# Patient Record
Sex: MALE | Race: WHITE | NOT HISPANIC OR LATINO | URBAN - METROPOLITAN AREA
[De-identification: names, ages, dates, MRNs, and addresses within clinical notes are randomized per-mention and may not be internally consistent; named-entity substitution may affect disease eponyms.]

---

## 2018-11-12 PROBLEM — 400047006 PERIPHERAL VASCULAR DISEASE: Status: ACTIVE | Noted: 2018-11-12

## 2018-11-12 PROBLEM — 385627004 CELLULITIS: Status: ACTIVE | Noted: 2018-11-12

## 2018-11-12 PROBLEM — 38341003 HYPERTENSION: Status: ACTIVE | Noted: 2018-11-12

## 2018-11-12 PROBLEM — 73430006 SLEEP APNEA: Status: ACTIVE | Noted: 2018-11-12

## 2018-11-12 PROBLEM — 14304000 THYROID DISORDER: Status: ACTIVE | Noted: 2018-11-12

## 2018-11-12 PROBLEM — 3723001 ARTHRITIS: Status: ACTIVE | Noted: 2018-11-12

## 2018-11-12 PROBLEM — 254900004 PROSTATE CANCER: Status: ACTIVE | Noted: 2018-11-12

## 2018-11-12 PROBLEM — 271737000 ANEMIA: Status: ACTIVE | Noted: 2018-11-12

## 2018-11-12 PROBLEM — 267038008 EDEMA: Status: ACTIVE | Noted: 2018-11-12

## 2018-12-10 PROBLEM — 428283002 HISTORY OF POLYP OF COLON: Status: ACTIVE | Noted: 2018-12-10

## 2018-12-10 PROBLEM — 161701005 HISTORY OF RESPIRATOR DEPENDENCE: Status: ACTIVE | Noted: 2018-12-10

## 2020-08-11 ENCOUNTER — OFFICE VISIT (OUTPATIENT)
Dept: URBAN - METROPOLITAN AREA CLINIC 46 | Facility: CLINIC | Age: 80
End: 2020-08-11

## 2020-08-11 PROBLEM — 70153002 HEMORRHOIDS: Status: ACTIVE | Noted: 2020-08-11

## 2020-08-11 PROBLEM — 255417007 CIRRHOTIC: Status: ACTIVE | Noted: 2020-08-11

## 2020-08-11 PROBLEM — 90708001 RENAL DISEASE: Status: ACTIVE | Noted: 2020-08-11

## 2020-09-22 ENCOUNTER — OFFICE VISIT (OUTPATIENT)
Dept: URBAN - METROPOLITAN AREA CLINIC 46 | Facility: CLINIC | Age: 80
End: 2020-09-22

## 2020-11-13 ENCOUNTER — OFFICE VISIT (OUTPATIENT)
Dept: URBAN - METROPOLITAN AREA SURGERY CENTER 28 | Facility: SURGERY CENTER | Age: 80
End: 2020-11-13

## 2020-11-13 LAB — PDFREPORT1: (no result)

## 2020-11-16 ENCOUNTER — OFFICE VISIT (OUTPATIENT)
Dept: URBAN - METROPOLITAN AREA CLINIC 13 | Facility: CLINIC | Age: 80
End: 2020-11-16

## 2020-11-17 ENCOUNTER — LAB OUTSIDE AN ENCOUNTER (OUTPATIENT)
Dept: URBAN - METROPOLITAN AREA CLINIC 13 | Facility: CLINIC | Age: 80
End: 2020-11-17

## 2020-11-18 ENCOUNTER — OFFICE VISIT (OUTPATIENT)
Dept: URBAN - METROPOLITAN AREA CLINIC 13 | Facility: CLINIC | Age: 80
End: 2020-11-18

## 2020-11-19 ENCOUNTER — OFFICE VISIT (OUTPATIENT)
Dept: URBAN - METROPOLITAN AREA CLINIC 13 | Facility: CLINIC | Age: 80
End: 2020-11-19

## 2020-12-29 ENCOUNTER — OFFICE VISIT (OUTPATIENT)
Dept: URBAN - METROPOLITAN AREA CLINIC 46 | Facility: CLINIC | Age: 80
End: 2020-12-29

## 2021-01-04 ENCOUNTER — OFFICE VISIT (OUTPATIENT)
Dept: URBAN - METROPOLITAN AREA CLINIC 13 | Facility: CLINIC | Age: 81
End: 2021-01-04

## 2021-02-19 ENCOUNTER — OFFICE VISIT (OUTPATIENT)
Dept: URBAN - METROPOLITAN AREA CLINIC 13 | Facility: CLINIC | Age: 81
End: 2021-02-19

## 2021-03-31 ENCOUNTER — OFFICE VISIT (OUTPATIENT)
Dept: URBAN - METROPOLITAN AREA CLINIC 46 | Facility: CLINIC | Age: 81
End: 2021-03-31

## 2021-05-03 ENCOUNTER — OFFICE VISIT (OUTPATIENT)
Dept: URBAN - METROPOLITAN AREA CLINIC 13 | Facility: CLINIC | Age: 81
End: 2021-05-03

## 2021-05-13 ENCOUNTER — OFFICE VISIT (OUTPATIENT)
Dept: URBAN - METROPOLITAN AREA CLINIC 46 | Facility: CLINIC | Age: 81
End: 2021-05-13

## 2021-05-13 PROBLEM — 235595009 GASTROESOPHAGEAL REFLUX DISEASE: Status: ACTIVE | Noted: 2021-05-13

## 2021-05-18 ENCOUNTER — OFFICE VISIT (OUTPATIENT)
Dept: URBAN - METROPOLITAN AREA CLINIC 13 | Facility: CLINIC | Age: 81
End: 2021-05-18

## 2021-06-29 ENCOUNTER — OFFICE VISIT (OUTPATIENT)
Dept: URBAN - METROPOLITAN AREA CLINIC 46 | Facility: CLINIC | Age: 81
End: 2021-06-29

## 2021-07-08 ENCOUNTER — OFFICE VISIT (OUTPATIENT)
Dept: URBAN - METROPOLITAN AREA CLINIC 46 | Facility: CLINIC | Age: 81
End: 2021-07-08

## 2021-07-08 PROBLEM — 13644009 HYPERCHOLESTEROLEMIA: Status: ACTIVE | Noted: 2021-07-08

## 2021-07-13 ENCOUNTER — OFFICE VISIT (OUTPATIENT)
Dept: URBAN - METROPOLITAN AREA CLINIC 13 | Facility: CLINIC | Age: 81
End: 2021-07-13

## 2021-08-28 ENCOUNTER — TELEPHONE ENCOUNTER (OUTPATIENT)
Dept: URBAN - METROPOLITAN AREA CLINIC 13 | Facility: CLINIC | Age: 81
End: 2021-08-28

## 2021-08-28 RX ORDER — FERROUS SULFATE 324(65)MG
TABLET, DELAYED RELEASE (ENTERIC COATED) ORAL
OUTPATIENT
End: 2021-03-31

## 2021-08-28 RX ORDER — RIFAXIMIN 550 MG/1
TABLET ORAL
OUTPATIENT
Start: 2020-09-22 | End: 2020-12-29

## 2021-08-28 RX ORDER — SPIRONOLACTONE 50 MG/1
TABLET, FILM COATED ORAL
OUTPATIENT
End: 2020-09-15

## 2021-08-28 RX ORDER — TAMSULOSIN HYDROCHLORIDE 0.4 MG/1
CAPSULE ORAL
OUTPATIENT
End: 2021-03-31

## 2021-08-28 RX ORDER — PANTOPRAZOLE SODIUM 40 MG/1
TABLET, DELAYED RELEASE ORAL
OUTPATIENT
Start: 2018-12-10 | End: 2020-12-29

## 2021-08-28 RX ORDER — ASPIRIN 81 MG/1
TABLET, COATED ORAL
OUTPATIENT
End: 2021-03-31

## 2021-08-28 RX ORDER — SPIRONOLACTONE 50 MG/1
TABLET, FILM COATED ORAL
OUTPATIENT
Start: 2020-08-11 | End: 2020-09-15

## 2021-08-28 RX ORDER — FUROSEMIDE 40 MG/1
TABLET ORAL
OUTPATIENT
End: 2021-02-19

## 2021-08-28 RX ORDER — ALBUTEROL SULFATE 90 UG/1
AEROSOL, METERED RESPIRATORY (INHALATION)
OUTPATIENT
End: 2021-03-31

## 2021-08-28 RX ORDER — SUCRALFATE 1 G/1
TABLET ORAL
OUTPATIENT
Start: 2020-11-13 | End: 2021-05-13

## 2021-08-29 ENCOUNTER — TELEPHONE ENCOUNTER (OUTPATIENT)
Dept: URBAN - METROPOLITAN AREA CLINIC 13 | Facility: CLINIC | Age: 81
End: 2021-08-29

## 2021-08-29 RX ORDER — LABETALOL HYDROCHLORIDE 300 MG/1
TABLET, FILM COATED ORAL
Status: ACTIVE | COMMUNITY

## 2021-08-29 RX ORDER — LEVOTHYROXINE SODIUM 0.14 MG/1
TABLET ORAL
Status: ACTIVE | COMMUNITY

## 2021-08-29 RX ORDER — RIFAXIMIN 550 MG/1
TABLET ORAL
Status: ACTIVE | COMMUNITY
Start: 2021-07-08

## 2021-08-29 RX ORDER — TRIAMCINOLONE ACETONIDE 5 MG/G
CREAM TOPICAL
Status: ACTIVE | COMMUNITY

## 2021-08-29 RX ORDER — AMOXICILLIN 500 MG/1
CAPSULE ORAL
Status: ACTIVE | COMMUNITY

## 2021-08-29 RX ORDER — CLOPIDOGREL 75 MG/1
TABLET ORAL
Status: ACTIVE | COMMUNITY

## 2021-08-29 RX ORDER — PANTOPRAZOLE SODIUM 40 MG/1
TABLET, DELAYED RELEASE ORAL
Status: ACTIVE | COMMUNITY
Start: 2020-12-29

## 2021-08-29 RX ORDER — SPIRONOLACTONE 50 MG/1
TABLET, FILM COATED ORAL
Status: ACTIVE | COMMUNITY
Start: 2020-09-15

## 2021-08-29 RX ORDER — ENZALUTAMIDE 40 MG/1
CAPSULE ORAL
Status: ACTIVE | COMMUNITY

## 2021-08-29 RX ORDER — FLUTICASONE PROPIONATE 50 UG/1
SPRAY, METERED NASAL
Status: ACTIVE | COMMUNITY

## 2021-08-29 RX ORDER — FUROSEMIDE 40 MG/1
TABLET ORAL
Status: ACTIVE | COMMUNITY

## 2021-08-29 RX ORDER — HYDROCODONE BITARTRATE AND ACETAMINOPHEN 7.5; 325 MG/1; MG/1
TABLET ORAL
Status: ACTIVE | COMMUNITY

## 2021-09-29 ENCOUNTER — ERX REFILL RESPONSE (OUTPATIENT)
Dept: URBAN - METROPOLITAN AREA CLINIC 44 | Facility: CLINIC | Age: 81
End: 2021-09-29

## 2021-09-29 RX ORDER — FUROSEMIDE 40 MG/1
TABLET ORAL
OUTPATIENT

## 2021-09-29 RX ORDER — FUROSEMIDE 40 MG/1
TAKE 1 TABLET BY MOUTH TWICE A DAY TABLET ORAL
Qty: 180 TABLET | Refills: 1 | OUTPATIENT

## 2021-10-04 ENCOUNTER — OFFICE VISIT (OUTPATIENT)
Dept: URBAN - METROPOLITAN AREA CLINIC 46 | Facility: CLINIC | Age: 81
End: 2021-10-04

## 2021-10-05 ENCOUNTER — OFFICE VISIT (OUTPATIENT)
Dept: URBAN - METROPOLITAN AREA CLINIC 46 | Facility: CLINIC | Age: 81
End: 2021-10-05
Payer: MEDICARE

## 2021-10-05 VITALS
DIASTOLIC BLOOD PRESSURE: 66 MMHG | TEMPERATURE: 97.4 F | SYSTOLIC BLOOD PRESSURE: 126 MMHG | HEART RATE: 74 BPM | HEIGHT: 70 IN | WEIGHT: 175 LBS | BODY MASS INDEX: 25.05 KG/M2

## 2021-10-05 DIAGNOSIS — R18.8 OTHER ASCITES: ICD-10-CM

## 2021-10-05 DIAGNOSIS — K74.60 UNSPECIFIED CIRRHOSIS OF LIVER: ICD-10-CM

## 2021-10-05 PROCEDURE — 99214 OFFICE O/P EST MOD 30 MIN: CPT | Performed by: INTERNAL MEDICINE

## 2021-10-05 RX ORDER — PANTOPRAZOLE SODIUM 40 MG/1
TABLET, DELAYED RELEASE ORAL
Status: ACTIVE | COMMUNITY
Start: 2020-12-29

## 2021-10-05 RX ORDER — SPIRONOLACTONE 50 MG/1
TABLET, FILM COATED ORAL
Status: ACTIVE | COMMUNITY
Start: 2020-09-15

## 2021-10-05 RX ORDER — TRIAMCINOLONE ACETONIDE 5 MG/G
CREAM TOPICAL
Status: ACTIVE | COMMUNITY

## 2021-10-05 RX ORDER — FLUTICASONE PROPIONATE 50 UG/1
SPRAY, METERED NASAL
Status: ACTIVE | COMMUNITY

## 2021-10-05 RX ORDER — LEVOTHYROXINE SODIUM 0.14 MG/1
TABLET ORAL
Status: ACTIVE | COMMUNITY

## 2021-10-05 RX ORDER — LABETALOL HYDROCHLORIDE 300 MG/1
TABLET, FILM COATED ORAL
Status: ACTIVE | COMMUNITY

## 2021-10-05 RX ORDER — ZOLPIDEM TARTRATE 5 MG/1
1 TABLET AT BEDTIME TABLET, FILM COATED ORAL ONCE A DAY
Status: ACTIVE | COMMUNITY

## 2021-10-05 RX ORDER — PANTOPRAZOLE SODIUM 40 MG/1
TABLET, DELAYED RELEASE ORAL
OUTPATIENT
Start: 2020-12-29

## 2021-10-05 RX ORDER — ENZALUTAMIDE 40 MG/1
CAPSULE ORAL
Status: ACTIVE | COMMUNITY

## 2021-10-05 RX ORDER — FUROSEMIDE 40 MG/1
TAKE 1 TABLET BY MOUTH TWICE A DAY TABLET ORAL
OUTPATIENT

## 2021-10-05 RX ORDER — FUROSEMIDE 40 MG/1
TAKE 1 TABLET BY MOUTH TWICE A DAY TABLET ORAL
Qty: 180 TABLET | Refills: 1 | Status: ACTIVE | COMMUNITY

## 2021-10-05 RX ORDER — SPIRONOLACTONE 50 MG/1
TABLET, FILM COATED ORAL
OUTPATIENT
Start: 2020-09-15

## 2021-10-05 RX ORDER — HYDROCODONE BITARTRATE AND ACETAMINOPHEN 7.5; 325 MG/1; MG/1
TABLET ORAL
Status: ACTIVE | COMMUNITY

## 2021-10-05 RX ORDER — AMOXICILLIN 500 MG/1
CAPSULE ORAL
Status: DISCONTINUED | COMMUNITY

## 2021-10-05 RX ORDER — CLOPIDOGREL 75 MG/1
TABLET ORAL
Status: DISCONTINUED | COMMUNITY

## 2021-10-05 NOTE — HPI-TODAY'S VISIT:
Pt with a hx of radhaley AKHTAR cirrhosis dx 12/2018. EGD and colonoscopy 12/2018 revealed small esophageal varices and portal HTN gastropathy with a large pre-pyloric hyperplastic polyps that was removed and a colon polyps. Re-presented 8/11/20 for new onset ascites/hepatic decompensation likely related to stress from uncomplicated right hip replacement on 7/10/20. LVP 8/17/20 with 2.5 Liters removed and fluid studies consistent with portal HTN. Despite increasing doses of diuretics pt became paracentesis dependent requiring LVP every 2-3 weeks. Most recently on Lasix 40mg BID and Aldacton 50mg BID (100mg causing hypotension). Repeat EGD 11/13/2020 revealed re-growth of the pre-pyloric 5cm lesion and removed endoscopically and path still showing is hyperplastic. CT abd/pelvis without liver lesions. Since 5/2021 developed recurrent pleural effusion is now s/p a Pluerex drain 6/21/2021. Last seen 7/8/2021 and pt had continuedto loose weight and have anorexia; getting weak and not able to walk well; has times of confusion despite lactulose BID (was not able to get Xifaxin). Still taking Lasix/Aldactone 40/50mg BID. Pt referred to palliative care. Not seen since and presents for a follow up. Stable and no further decompemsation; draining Pleurex three times a week now. Had a few falls but none were severe. No confusion. No bleeding. Some insomina. Taking Lasix 40mg BID and Aldactone 100mg daily; 20gm Lactulose BID. PPI. Labs 8/30/2021 with Cr 1.26; Albumin 3.4; normal LFT's; Hgb 11.3; PLT 89. INR 0.97

## 2021-10-05 NOTE — PHYSICAL EXAM CHEST:
no lesions,  no deformities,  no traumatic injuries,  no significant scars are present,  chest wall non-tender,  no masses present, breathing is unlabored without accessory muscle use, diminished breath on right lung

## 2021-10-05 NOTE — PHYSICAL EXAM GASTROINTESTINAL
Abdomen , soft, nontender, mild distention with fluid wave. , normal bowel sounds , Liver and Spleen , no hepatomegaly present , no hepatosplenomegaly , liver nontender , spleen not palpable

## 2021-10-15 ENCOUNTER — TELEPHONE ENCOUNTER (OUTPATIENT)
Dept: URBAN - METROPOLITAN AREA CLINIC 46 | Facility: CLINIC | Age: 81
End: 2021-10-15

## 2021-10-21 ENCOUNTER — ERX REFILL RESPONSE (OUTPATIENT)
Age: 81
End: 2021-10-21

## 2021-10-21 RX ORDER — LACTULOSE 10 G/15ML
30ML SOLUTION ORAL
Qty: 1200 MILLILITER | Refills: 4 | OUTPATIENT

## 2021-10-25 ENCOUNTER — TELEPHONE ENCOUNTER (OUTPATIENT)
Dept: URBAN - METROPOLITAN AREA CLINIC 46 | Facility: CLINIC | Age: 81
End: 2021-10-25

## 2021-11-02 ENCOUNTER — OFFICE VISIT (OUTPATIENT)
Dept: URBAN - METROPOLITAN AREA CLINIC 46 | Facility: CLINIC | Age: 81
End: 2021-11-02
Payer: MEDICARE

## 2021-11-02 VITALS
BODY MASS INDEX: 26.08 KG/M2 | TEMPERATURE: 98.4 F | SYSTOLIC BLOOD PRESSURE: 129 MMHG | WEIGHT: 182.2 LBS | HEIGHT: 70 IN | DIASTOLIC BLOOD PRESSURE: 67 MMHG | HEART RATE: 82 BPM

## 2021-11-02 DIAGNOSIS — R18.8 OTHER ASCITES: ICD-10-CM

## 2021-11-02 DIAGNOSIS — K74.60 UNSPECIFIED CIRRHOSIS OF LIVER: ICD-10-CM

## 2021-11-02 PROCEDURE — 99215 OFFICE O/P EST HI 40 MIN: CPT | Performed by: INTERNAL MEDICINE

## 2021-11-02 PROCEDURE — 99205 OFFICE O/P NEW HI 60 MIN: CPT | Performed by: INTERNAL MEDICINE

## 2021-11-02 RX ORDER — ENZALUTAMIDE 40 MG/1
CAPSULE ORAL
Status: ACTIVE | COMMUNITY

## 2021-11-02 RX ORDER — FUROSEMIDE 40 MG/1
TAKE 1 TABLET BY MOUTH TWICE A DAY TABLET ORAL
Status: ACTIVE | COMMUNITY

## 2021-11-02 RX ORDER — ZOLPIDEM TARTRATE 5 MG/1
1 TABLET AT BEDTIME TABLET, FILM COATED ORAL ONCE A DAY
Status: DISCONTINUED | COMMUNITY

## 2021-11-02 RX ORDER — SPIRONOLACTONE 50 MG/1
TABLET, FILM COATED ORAL
Status: ACTIVE | COMMUNITY
Start: 2020-09-15

## 2021-11-02 RX ORDER — TRIAMCINOLONE ACETONIDE 5 MG/G
CREAM TOPICAL
Status: ACTIVE | COMMUNITY

## 2021-11-02 RX ORDER — PANTOPRAZOLE SODIUM 40 MG/1
TABLET, DELAYED RELEASE ORAL
Status: ACTIVE | COMMUNITY
Start: 2020-12-29

## 2021-11-02 RX ORDER — LACTULOSE 10 G/15ML
30ML SOLUTION ORAL
Qty: 1200 MILLILITER | Refills: 4 | Status: ACTIVE | COMMUNITY

## 2021-11-02 RX ORDER — FLUTICASONE PROPIONATE 50 UG/1
SPRAY, METERED NASAL
Status: ACTIVE | COMMUNITY

## 2021-11-02 RX ORDER — HYDROCODONE BITARTRATE AND ACETAMINOPHEN 7.5; 325 MG/1; MG/1
TABLET ORAL
Status: DISCONTINUED | COMMUNITY

## 2021-11-02 RX ORDER — NICOTINE POLACRILEX 2 MG
AS DIRECTED LOZENGE BUCCAL
Status: ACTIVE | COMMUNITY

## 2021-11-02 RX ORDER — TRAZODONE HYDROCHLORIDE 50 MG/1
1 TABLET AT BEDTIME AS NEEDED TABLET, FILM COATED ORAL ONCE A DAY
Status: ACTIVE | COMMUNITY

## 2021-11-02 RX ORDER — AMITRIPTYLINE HYDROCHLORIDE 10 MG/1
1 TABLET AT BEDTIME TABLET, FILM COATED ORAL ONCE A DAY
Status: ACTIVE | COMMUNITY

## 2021-11-02 RX ORDER — B-COMPLEX WITH VITAMIN C
AS DIRECTED TABLET ORAL
Status: ACTIVE | COMMUNITY

## 2021-11-02 RX ORDER — LABETALOL HYDROCHLORIDE 300 MG/1
TABLET, FILM COATED ORAL
Status: DISCONTINUED | COMMUNITY

## 2021-11-02 RX ORDER — LEVOTHYROXINE SODIUM 0.14 MG/1
TABLET ORAL
Status: ACTIVE | COMMUNITY

## 2021-11-02 NOTE — PHYSICAL EXAM GASTROINTESTINAL
Abdomen , distended and tense with fluid wave , normal bowel sounds , Liver and Spleen , no hepatomegaly present , no hepatosplenomegaly , liver nontender , spleen not palpable; reducible umbilical hernia

## 2021-11-02 NOTE — HPI-TODAY'S VISIT:
Pt with a hx of radhaley AKHTAR cirrhosis dx 12/2018. EGD and colonoscopy 12/2018 revealed small esophageal varices and portal HTN gastropathy with a large pre-pyloric hyperplastic polyps that was removed and a colon polyps. Re-presented 8/11/20 for new onset ascites/hepatic decompensation likely related to stress from uncomplicated right hip replacement on 7/10/20. LVP 8/17/20 with 2.5 Liters removed and fluid studies consistent with portal HTN. Despite increasing doses of diuretics pt became paracentesis dependent requiring LVP every 2-3 weeks. Most recently on Lasix 40mg BID and Aldacton 50mg BID (100mg causing hypotension). Repeat EGD 11/13/2020 revealed re-growth of the pre-pyloric 5cm lesion and removed endoscopically and path still showing is hyperplastic. CT abd/pelvis without liver lesions. Since 5/2021 developed recurrent pleural effusion is now s/p a Pluerex drain 6/21/2021.  Seen 7/8/2021 and pt had continued to loose weight and have anorexia; getting weak and not able to walk well; episodes  of confusion despite lactulose BID (was not able to get Xifaxin). Still taking Lasix/Aldactone 40/50mg BID. Pt referred to palliative care.  Seen for a follow up 10/5/2021 and no further decompemsation; draining Pleurex three times a week now. Had a few falls but none were severe. No confusion. No bleeding. Some insomina. Taking Lasix 40mg BID and Aldactone 100mg daily; 20gm Lactulose BID. PPI. Labs 8/30/2021 with Cr 1.26; Albumin 3.4; normal LFT's; Hgb 11.3; PLT 89. INR 0.97. Plan was to continue med regimen and draining 3 times a week from Pleurex  Since seen wife called with worsening orthostasis and lasix dropped from 40mg BID to once a day. Pt then developed more HENRRY and wife started drainaing Pleurex daily and putting out over a liter. Did this for 5 days then stopped and back to 3 times a week. HENRRY is mildly better. No falls since lowering dose of Lasix. No confusion. No bleeding. Have spoke with Thoracic surgery and a pleurodesis is  not an option and advised better fluid management.

## 2021-11-15 ENCOUNTER — ERX REFILL RESPONSE (OUTPATIENT)
Dept: URBAN - METROPOLITAN AREA CLINIC 44 | Facility: CLINIC | Age: 81
End: 2021-11-15

## 2021-11-15 RX ORDER — LACTULOSE 10 G/15ML
30ML SOLUTION ORAL
Qty: 1200 MILLILITER | Refills: 4 | OUTPATIENT

## 2021-11-15 RX ORDER — LACTULOSE 10 G/15ML
GIVE 30ML BY MOUTH 4 TIMES A DAY AS NEEDED FOR 30 DAYS SOLUTION ORAL
Qty: 1200 MILLILITER | Refills: 4 | OUTPATIENT

## 2021-11-15 RX ORDER — SPIRONOLACTONE 50 MG/1
TAKE 2 TABLET BY MOUTH ONCE A DAY AS DIRECTED FOR 30 DAYS TABLET ORAL
Qty: 180 TABLET | Refills: 4 | OUTPATIENT

## 2021-11-15 RX ORDER — SPIRONOLACTONE 50 MG/1
TABLET, FILM COATED ORAL
OUTPATIENT

## 2022-01-06 ENCOUNTER — DASHBOARD ENCOUNTERS (OUTPATIENT)
Age: 82
End: 2022-01-06

## 2022-01-06 ENCOUNTER — OFFICE VISIT (OUTPATIENT)
Dept: URBAN - METROPOLITAN AREA CLINIC 46 | Facility: CLINIC | Age: 82
End: 2022-01-06
Payer: MEDICARE

## 2022-01-06 VITALS
BODY MASS INDEX: 24.91 KG/M2 | DIASTOLIC BLOOD PRESSURE: 81 MMHG | HEIGHT: 70 IN | SYSTOLIC BLOOD PRESSURE: 138 MMHG | HEART RATE: 90 BPM | TEMPERATURE: 98.4 F | WEIGHT: 174 LBS

## 2022-01-06 DIAGNOSIS — K74.60 UNSPECIFIED CIRRHOSIS OF LIVER: ICD-10-CM

## 2022-01-06 DIAGNOSIS — R18.8 OTHER ASCITES: ICD-10-CM

## 2022-01-06 PROBLEM — 389026000: Status: ACTIVE | Noted: 2021-10-05

## 2022-01-06 PROBLEM — 19943007: Status: ACTIVE | Noted: 2021-10-05

## 2022-01-06 PROCEDURE — 99214 OFFICE O/P EST MOD 30 MIN: CPT | Performed by: INTERNAL MEDICINE

## 2022-01-06 RX ORDER — SPIRONOLACTONE 50 MG/1
TAKE 2 TABLET BY MOUTH ONCE A DAY AS DIRECTED FOR 30 DAYS TABLET ORAL
OUTPATIENT

## 2022-01-06 RX ORDER — LACTULOSE 10 G/15ML
GIVE 30ML BY MOUTH 4 TIMES A DAY AS NEEDED FOR 30 DAYS SOLUTION ORAL
OUTPATIENT

## 2022-01-06 RX ORDER — SPIRONOLACTONE 50 MG/1
TAKE 2 TABLET BY MOUTH ONCE A DAY AS DIRECTED FOR 30 DAYS TABLET ORAL
Qty: 180 TABLET | Refills: 4 | Status: ACTIVE | COMMUNITY

## 2022-01-06 RX ORDER — LEVOTHYROXINE SODIUM 0.14 MG/1
TABLET ORAL
Status: ACTIVE | COMMUNITY

## 2022-01-06 RX ORDER — AMITRIPTYLINE HYDROCHLORIDE 10 MG/1
1 TABLET AT BEDTIME TABLET, FILM COATED ORAL ONCE A DAY
Status: ACTIVE | COMMUNITY

## 2022-01-06 RX ORDER — FUROSEMIDE 40 MG/1
TAKE 1 TABLET BY MOUTH TWICE A DAY TABLET ORAL
OUTPATIENT

## 2022-01-06 RX ORDER — PANTOPRAZOLE SODIUM 40 MG/1
TABLET, DELAYED RELEASE ORAL
OUTPATIENT
Start: 2020-12-29

## 2022-01-06 RX ORDER — B-COMPLEX WITH VITAMIN C
AS DIRECTED TABLET ORAL
Status: ACTIVE | COMMUNITY

## 2022-01-06 RX ORDER — PANTOPRAZOLE SODIUM 40 MG/1
TABLET, DELAYED RELEASE ORAL
Status: ACTIVE | COMMUNITY
Start: 2020-12-29

## 2022-01-06 RX ORDER — NICOTINE POLACRILEX 2 MG
AS DIRECTED LOZENGE BUCCAL
Status: ACTIVE | COMMUNITY

## 2022-01-06 RX ORDER — FLUTICASONE PROPIONATE 50 UG/1
SPRAY, METERED NASAL
Status: ACTIVE | COMMUNITY

## 2022-01-06 RX ORDER — LACTULOSE 10 G/15ML
GIVE 30ML BY MOUTH 4 TIMES A DAY AS NEEDED FOR 30 DAYS SOLUTION ORAL
Qty: 1200 MILLILITER | Refills: 4 | Status: ACTIVE | COMMUNITY

## 2022-01-06 RX ORDER — TRAZODONE HYDROCHLORIDE 50 MG/1
1 TABLET AT BEDTIME AS NEEDED TABLET, FILM COATED ORAL ONCE A DAY
Status: ACTIVE | COMMUNITY

## 2022-01-06 RX ORDER — FUROSEMIDE 40 MG/1
TAKE 1 TABLET BY MOUTH TWICE A DAY TABLET ORAL
Status: ACTIVE | COMMUNITY

## 2022-01-06 RX ORDER — ENZALUTAMIDE 40 MG/1
CAPSULE ORAL
Status: ACTIVE | COMMUNITY

## 2022-01-06 RX ORDER — TRIAMCINOLONE ACETONIDE 5 MG/G
CREAM TOPICAL
Status: ACTIVE | COMMUNITY

## 2022-01-06 NOTE — HPI-TODAY'S VISIT:
Pt with a hx of rdahaley AKHTAR cirrhosis dx 12/2018. EGD and colonoscopy 12/2018 revealed small esophageal varices and portal HTN gastropathy with a large pre-pyloric hyperplastic polyps that was removed and a colon polyps. Re-presented 8/11/20 for new onset ascites/hepatic decompensation likely related to stress from uncomplicated right hip replacement on 7/10/20. LVP 8/17/20 with 2.5 Liters removed and fluid studies consistent with portal HTN. Despite increasing doses of diuretics pt became paracentesis dependent requiring LVP every 2-3 weeks. Most recently on Lasix 40mg BID and Aldacton 50mg BID (100mg causing hypotension). Repeat EGD 11/13/2020 revealed re-growth of the pre-pyloric 5cm lesion and removed endoscopically and path still showing is hyperplastic. CT abd/pelvis without liver lesions. Since 5/2021 developed recurrent pleural effusion is now s/p a Pluerex drain 6/21/2021.  Seen 7/8/2021 and pt had continued to loose weight and have anorexia; getting weak and not able to walk well; episodes  of confusion despite lactulose BID (was not able to get Xifaxin). Still taking Lasix/Aldactone 40/50mg BID. Pt referred to palliative care.  Seen for a follow up 10/5/2021 and no further decompemsation; draining Pleurex three times a week now. Had a few falls but none were severe. No confusion. No bleeding. Some insomina. Taking Lasix 40mg BID and Aldactone 100mg daily; 20gm Lactulose BID. PPI. Labs 8/30/2021 with Cr 1.26; Albumin 3.4; normal LFT's; Hgb 11.3; PLT 89. INR 0.97. Plan was to continue med regimen and draining 3 times a week from Pleurex  Seen for follow up 11/2/2021 as wife called with worsening orthostasis and lasix dropped from 40mg BID to once a day. Pt then developed more HENRRY and wife started drainaing Pleurex daily and putting out over a liter. Did this for 5 days then stopped and back to 3 times a week. Pt was overall better and plan was to continue Aldactone 100mg daily and Lasix 40mg daily. Pt wewnt for LVP 11/5/2021 but not enough ascites to drain. Was to continue to drain using Pleuerx three days a week. Pt now presents for a eight week follow up. Less frequent falls. Less frequent bouts of confusion. No abdominal distention. Draining Pleuerx 1Liter every other day and still with episodes of HENRRY. Still on Lactulose BID; Aldactone 100mg; Lasix 40mg. [facility]/2021 with stable CBC.

## 2022-02-17 ENCOUNTER — OFFICE VISIT (OUTPATIENT)
Dept: URBAN - METROPOLITAN AREA CLINIC 46 | Facility: CLINIC | Age: 82
End: 2022-02-17

## 2022-02-23 ENCOUNTER — ERX REFILL RESPONSE (OUTPATIENT)
Dept: URBAN - METROPOLITAN AREA CLINIC 44 | Facility: CLINIC | Age: 82
End: 2022-02-23

## 2022-02-23 RX ORDER — FUROSEMIDE 40 MG/1
TAKE 1 TABLET BY MOUTH TWICE A DAY TABLET ORAL
Qty: 180 TABLET | Refills: 1 | OUTPATIENT

## 2022-02-23 RX ORDER — FUROSEMIDE 40 MG/1
TAKE 1 TABLET BY MOUTH TWICE A DAY TABLET ORAL
Qty: 180 TABLET | Refills: 4 | OUTPATIENT

## 2022-03-11 ENCOUNTER — ERX REFILL RESPONSE (OUTPATIENT)
Dept: URBAN - METROPOLITAN AREA CLINIC 44 | Facility: CLINIC | Age: 82
End: 2022-03-11

## 2022-03-11 RX ORDER — PANTOPRAZOLE SODIUM 40 MG/1
TABLET, DELAYED RELEASE ORAL
OUTPATIENT

## 2022-03-11 RX ORDER — PANTOPRAZOLE SODIUM 40 MG/1
TAKE 1 TABLET BY MOUTH ONCE A DAY TABLET, DELAYED RELEASE ORAL
Qty: 90 TABLET | Refills: 4 | OUTPATIENT

## 2022-04-12 ENCOUNTER — OFFICE VISIT (OUTPATIENT)
Dept: URBAN - METROPOLITAN AREA CLINIC 46 | Facility: CLINIC | Age: 82
End: 2022-04-12